# Patient Record
Sex: FEMALE | Race: OTHER | Employment: STUDENT | URBAN - METROPOLITAN AREA
[De-identification: names, ages, dates, MRNs, and addresses within clinical notes are randomized per-mention and may not be internally consistent; named-entity substitution may affect disease eponyms.]

---
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
## 2020-02-10 ENCOUNTER — OFFICE VISIT (OUTPATIENT)
Dept: URGENT CARE | Age: 20
End: 2020-02-10

## 2020-02-10 VITALS
DIASTOLIC BLOOD PRESSURE: 75 MMHG | HEART RATE: 78 BPM | RESPIRATION RATE: 18 BRPM | SYSTOLIC BLOOD PRESSURE: 103 MMHG | TEMPERATURE: 98.1 F | OXYGEN SATURATION: 97 %

## 2020-02-10 DIAGNOSIS — R05.9 COUGH: ICD-10-CM

## 2020-02-10 DIAGNOSIS — R52 BODY ACHES: ICD-10-CM

## 2020-02-10 DIAGNOSIS — J06.9 ACUTE UPPER RESPIRATORY INFECTION, UNSPECIFIED: Primary | ICD-10-CM

## 2020-02-10 LAB
FLUAV AG UPPER RESP QL IA.RAPID: NEGATIVE
FLUBV AG UPPER RESP QL IA.RAPID: NEGATIVE
INTERNAL PROCEDURAL CONTROLS ACCEPTABLE: YES
S PYO AG THROAT QL IA.RAPID: POSITIVE

## 2020-02-10 PROCEDURE — 87804 INFLUENZA ASSAY W/OPTIC: CPT | Performed by: PHYSICIAN ASSISTANT

## 2020-02-10 PROCEDURE — 87880 STREP A ASSAY W/OPTIC: CPT | Performed by: PHYSICIAN ASSISTANT

## 2020-02-10 PROCEDURE — 99214 OFFICE O/P EST MOD 30 MIN: CPT | Performed by: PHYSICIAN ASSISTANT

## 2020-02-10 RX ORDER — AMOXICILLIN AND CLAVULANATE POTASSIUM 875; 125 MG/1; MG/1
1 TABLET, FILM COATED ORAL 2 TIMES DAILY
Qty: 14 TABLET | Refills: 0 | Status: SHIPPED | OUTPATIENT
Start: 2020-02-10 | End: 2020-02-17

## 2020-02-20 ENCOUNTER — OFFICE VISIT (OUTPATIENT)
Dept: URGENT CARE | Age: 20
End: 2020-02-20

## 2020-02-20 VITALS
SYSTOLIC BLOOD PRESSURE: 97 MMHG | HEART RATE: 65 BPM | OXYGEN SATURATION: 96 % | TEMPERATURE: 97.9 F | BODY MASS INDEX: 23.05 KG/M2 | DIASTOLIC BLOOD PRESSURE: 64 MMHG | WEIGHT: 135 LBS | RESPIRATION RATE: 16 BRPM | HEIGHT: 64 IN

## 2020-02-20 DIAGNOSIS — R05.9 COUGH: Primary | ICD-10-CM

## 2020-02-20 PROCEDURE — 99214 OFFICE O/P EST MOD 30 MIN: CPT | Performed by: PHYSICIAN ASSISTANT

## 2020-02-20 RX ORDER — METHYLPREDNISOLONE 4 MG/1
4 TABLET ORAL SEE ADMIN INSTRUCTIONS
Qty: 21 TABLET | Refills: 0 | Status: SHIPPED | OUTPATIENT
Start: 2020-02-20

## 2020-02-20 ASSESSMENT — ENCOUNTER SYMPTOMS
SPUTUM PRODUCTION: 0
COUGH: 1
CONSTITUTIONAL NEGATIVE: 1
WHEEZING: 0

## 2020-03-09 ENCOUNTER — TELEPHONE (OUTPATIENT)
Dept: FAMILY MEDICINE CLINIC | Facility: CLINIC | Age: 20
End: 2020-03-09

## 2020-03-09 NOTE — TELEPHONE ENCOUNTER
Attempted to reach pt.   Both numbers listed on chart - no answer/busy signal.  Please try again later

## 2020-03-12 ENCOUNTER — OFFICE VISIT (OUTPATIENT)
Dept: FAMILY MEDICINE CLINIC | Facility: CLINIC | Age: 20
End: 2020-03-12
Payer: COMMERCIAL

## 2020-03-12 VITALS
BODY MASS INDEX: 21.97 KG/M2 | HEART RATE: 69 BPM | DIASTOLIC BLOOD PRESSURE: 66 MMHG | WEIGHT: 124 LBS | HEIGHT: 63 IN | SYSTOLIC BLOOD PRESSURE: 100 MMHG | TEMPERATURE: 98 F

## 2020-03-12 DIAGNOSIS — R05.9 COUGH: Primary | ICD-10-CM

## 2020-03-12 DIAGNOSIS — R09.82 POST-NASAL DRIP: ICD-10-CM

## 2020-03-12 PROCEDURE — 99202 OFFICE O/P NEW SF 15 MIN: CPT | Performed by: FAMILY MEDICINE

## 2020-03-12 PROCEDURE — 99212 OFFICE O/P EST SF 10 MIN: CPT | Performed by: FAMILY MEDICINE

## 2020-03-12 RX ORDER — ALBUTEROL SULFATE 90 UG/1
2 AEROSOL, METERED RESPIRATORY (INHALATION) EVERY 6 HOURS PRN
Qty: 1 INHALER | Refills: 0 | Status: SHIPPED | OUTPATIENT
Start: 2020-03-12 | End: 2020-11-25

## 2020-03-12 RX ORDER — FLUTICASONE PROPIONATE 50 MCG
2 SPRAY, SUSPENSION (ML) NASAL NIGHTLY
Qty: 1 BOTTLE | Refills: 0 | Status: SHIPPED | OUTPATIENT
Start: 2020-03-12 | End: 2020-04-11

## 2020-03-12 RX ORDER — LORATADINE 10 MG/1
10 TABLET ORAL DAILY
Qty: 30 TABLET | Refills: 0 | Status: SHIPPED | OUTPATIENT
Start: 2020-03-12 | End: 2020-05-14

## 2020-03-12 NOTE — PROGRESS NOTES
HPI:    Patient ID: Parvin Christy is a 23year old female.     HPI  Patient presents with:  Cough: X 1 month pt states she went to immediate care in her school before but cough doesn't go away, pt also states she had strep about a month ago     Goes to [facility] every 6 (six) hours as needed for Wheezing or Shortness of Breath. Dispense: 1 Inhaler; Refill: 0    2. Post-nasal drip    - Fluticasone Propionate 50 MCG/ACT Nasal Suspension; 2 sprays by Each Nare route nightly. Dispense: 1 Bottle;  Refill: 0  - l

## 2020-05-14 ENCOUNTER — NURSE TRIAGE (OUTPATIENT)
Dept: FAMILY MEDICINE CLINIC | Facility: CLINIC | Age: 20
End: 2020-05-14

## 2020-05-14 ENCOUNTER — VIRTUAL PHONE E/M (OUTPATIENT)
Dept: FAMILY MEDICINE CLINIC | Facility: CLINIC | Age: 20
End: 2020-05-14
Payer: COMMERCIAL

## 2020-05-14 DIAGNOSIS — R05.9 COUGH: Primary | ICD-10-CM

## 2020-05-14 PROCEDURE — 99214 OFFICE O/P EST MOD 30 MIN: CPT | Performed by: FAMILY MEDICINE

## 2020-05-14 RX ORDER — LEVOCETIRIZINE DIHYDROCHLORIDE 5 MG/1
5 TABLET, FILM COATED ORAL EVERY EVENING
Qty: 30 TABLET | Refills: 1 | Status: SHIPPED | OUTPATIENT
Start: 2020-05-14 | End: 2020-11-25

## 2020-05-14 NOTE — PROGRESS NOTES
Due to the COVID-19 emergency implementation plan, this patient's visit was converted to a telephone visit as agreed upon with the patient.     Please note that the following visit was completed using two-way, real-time interactive audio and/or video commun negatives noted in the the HPI. PATIENTS DENIES ANY KNOWN EXPOSURE TO ANYONE CONFIRMED FOR COVID 19.         Past Medical History:   Diagnosis Date   • Dehydration 2 yr old         MEDICATION LIST    Current Outpatient Medications:   •  Levocetirizine Signed Prescriptions Disp Refills   • Levocetirizine Dihydrochloride 5 MG Oral Tab 30 tablet 1     Sig: Take 1 tablet (5 mg total) by mouth every evening.        Imaging & Referrals:  XR CHEST PA + LAT CHEST (CPT=71046)      Patient reminded to practice

## 2020-05-14 NOTE — TELEPHONE ENCOUNTER
Action Requested: Summary for Provider     []  Critical Lab, Recommendations Needed  [] Need Additional Advice  []   FYI    []   Need Orders  [] Need Medications Sent to Pharmacy  []  Other     SUMMARY: Patient reports cough with phlegm for 2 weeks, also w

## 2020-11-02 ENCOUNTER — HOSPITAL ENCOUNTER (OUTPATIENT)
Age: 20
Discharge: HOME | End: 2020-11-02
Attending: EMERGENCY MEDICINE
Payer: COMMERCIAL

## 2020-11-02 VITALS
OXYGEN SATURATION: 100 % | SYSTOLIC BLOOD PRESSURE: 116 MMHG | DIASTOLIC BLOOD PRESSURE: 68 MMHG | RESPIRATION RATE: 16 BRPM | HEART RATE: 112 BPM | TEMPERATURE: 98 F

## 2020-11-02 DIAGNOSIS — Z20.822 CLOSE EXPOSURE TO COVID-19 VIRUS: Primary | ICD-10-CM

## 2020-11-02 PROCEDURE — 99213 OFFICE O/P EST LOW 20 MIN: CPT

## 2020-11-05 NOTE — ED PROVIDER NOTES
Patient Seen in: [facility]      History   Patient presents with:  Testing    Stated Complaint: covid test    HPI    20 yo female with COVID exposure. She is asymptomatic.      Past Medical History:   Diagnosis Date   • Dehydration 2 yr old No sensory deficit.    Psychiatric:         Mood and Affect: Mood normal.         Behavior: Behavior normal.              ED Course     Labs Reviewed   2019 NOVEL CORONAVIRUS SARS-COV-2 BY PCR([facility])                  MDM                         Disposition a

## 2020-11-12 ENCOUNTER — OFFICE VISIT (OUTPATIENT)
Dept: FAMILY MEDICINE CLINIC | Facility: CLINIC | Age: 20
End: 2020-11-12
Payer: COMMERCIAL

## 2020-11-12 VITALS
BODY MASS INDEX: 20.2 KG/M2 | OXYGEN SATURATION: 98 % | TEMPERATURE: 99 F | WEIGHT: 114 LBS | RESPIRATION RATE: 12 BRPM | DIASTOLIC BLOOD PRESSURE: 76 MMHG | SYSTOLIC BLOOD PRESSURE: 107 MMHG | HEIGHT: 63 IN | HEART RATE: 66 BPM

## 2020-11-12 DIAGNOSIS — Z20.822 EXPOSURE TO COVID-19 VIRUS: Primary | ICD-10-CM

## 2020-11-12 PROCEDURE — 3074F SYST BP LT 130 MM HG: CPT | Performed by: NURSE PRACTITIONER

## 2020-11-12 PROCEDURE — 99202 OFFICE O/P NEW SF 15 MIN: CPT | Performed by: NURSE PRACTITIONER

## 2020-11-12 PROCEDURE — 3078F DIAST BP <80 MM HG: CPT | Performed by: NURSE PRACTITIONER

## 2020-11-12 PROCEDURE — 3008F BODY MASS INDEX DOCD: CPT | Performed by: NURSE PRACTITIONER

## 2020-11-12 NOTE — PROGRESS NOTES
CHIEF COMPLAINT:   Patient presents with:  Covid: Desires COVID testing. Exposure to Father who is COVID positve over the past couple weeks. Asymptomatic. HPI:   Alejandro Oliveruphin is a 21year old female who presents for Covid 19 exposure.   Father teste LUNGS: clear to auscultation bilaterally; good air movement. Breathing is non labored. Cough- absent.   CARDIO: RRR without murmur      ASSESSMENT AND PLAN:   Basim García is a 21year old female who presents with     ASSESSMENT: Exposure to covid-19 vi · Don’t share food or personal items with people in your household. This includes items like eating and drinking utensils, towels, and bedding. · Wear a cloth face mask around other people.  During a public health emergency, medical face masks may be reser · Wear a face mask. This is to protect other people from your germs. If you are not able to wear a mask, your caregivers should. During a public health emergency, medical face masks may be reserved for healthcare workers.  You may need to make a cloth face If you've been in the hospital for suspected or confirmed COVID-19 and now are home, follow all of your healthcare team's instructions. This will include when it's OK to stop self-isolation.  You may also get instructions on position changes to help your br Your limits are different if you've had COVID-19 in the last 3 months but are fully recovered without symptoms and you have been exposed to someone with COVID-19. If you are symptom-free, you don't need to stay home away from others or be retested.  The CDC When you return to public settings  When you are well enough to go outside your home, consider the CDC's guidance on cloth face masks:     · The CDC advises all people over age 2 to wear cloth face masks in public settings when around people outside of the © 2292-4332 [address] [facility] [place]. All rights reserved. This information is not intended as a substitute for professional medical care. Always follow your healthcare professional's instructions.

## 2020-11-25 PROBLEM — Z86.19 HISTORY OF CHLAMYDIA INFECTION: Status: ACTIVE | Noted: 2020-11-25

## 2021-07-08 ENCOUNTER — HOSPITAL ENCOUNTER (OUTPATIENT)
Age: 21
Discharge: HOME | End: 2021-07-08
Attending: PHYSICIAN ASSISTANT
Payer: COMMERCIAL

## 2021-07-08 VITALS
SYSTOLIC BLOOD PRESSURE: 114 MMHG | BODY MASS INDEX: 24.84 KG/M2 | HEIGHT: 62 IN | TEMPERATURE: 98 F | DIASTOLIC BLOOD PRESSURE: 74 MMHG | HEART RATE: 79 BPM | WEIGHT: 135 LBS | OXYGEN SATURATION: 100 % | RESPIRATION RATE: 16 BRPM

## 2021-07-08 DIAGNOSIS — B07.9 VIRAL WART ON FINGER: ICD-10-CM

## 2021-07-08 DIAGNOSIS — L03.011 PARONYCHIA OF FINGER OF RIGHT HAND: Primary | ICD-10-CM

## 2021-07-08 PROCEDURE — 99213 OFFICE O/P EST LOW 20 MIN: CPT | Performed by: PHYSICIAN ASSISTANT

## 2021-07-08 PROCEDURE — 10060 I&D ABSCESS SIMPLE/SINGLE: CPT | Performed by: PHYSICIAN ASSISTANT

## 2021-07-08 RX ORDER — IBUPROFEN 600 MG/1
TABLET ORAL
Qty: 20 TABLET | Refills: 0 | Status: SHIPPED | OUTPATIENT
Start: 2021-07-08

## 2021-07-08 RX ORDER — CEPHALEXIN 500 MG/1
500 CAPSULE ORAL 3 TIMES DAILY
Qty: 21 CAPSULE | Refills: 0 | Status: SHIPPED | OUTPATIENT
Start: 2021-07-08 | End: 2021-07-15

## 2021-07-08 NOTE — ED PROVIDER NOTES
Patient Seen in: [facility]    History   Patient presents with:  Derm Problem    Stated Complaint: swollen finger and pain x 1 week    HPI    26-year-old female presents with chief complaint of redness and swelling of right second digit.   Ons systems are as noted in HPI. Constitutional and vital signs reviewed. All other systems reviewed and negative except as noted above. PSFH elements reviewed from today and agreed except as otherwise stated in HPI.     Physical Exam     ED Triage Vit refill normal.  Motor intact distally. Sensory intact distally. No ecchymosis. No open wounds. No compartment syndrome. No edema. Left upper extremity-Left upper extremity without acute bony deformity.   A 4 mm x 4 mm circular lesion of hyperkeratot understanding of the discharge instructions and plan.       Disposition and Plan     Clinical Impression:  Paronychia of finger of right hand  (primary encounter diagnosis)  Viral wart on finger    Disposition:  Discharge    Follow-up:  Roge Braxton

## (undated) NOTE — LETTER
[address]  [address] [phone]     Patient: Rachell Nanceist   YOB: 2000   Date of Visit: 11/12/2020     Dear Employer,        November 12, 2020    At [facility], we are taking sp Persons infected with SARS-CoV-2 who never develop COVID-19 symptoms may discontinue isolation and other precautions 10 days after the date of their first positive RT-PCR test for SARS-CoV-2 RNA.     Persons who are asymptomatic but have been exposed, CDC r

## (undated) NOTE — ED AVS SNAPSHOT
Christopher Simpson   MRN: [id number]    Department:  [facility] Emergency Department   Date of Visit:  7/18/2019           Disclosure     Insurance plans vary and the physician(s) referred by the ER may not be covered by your plan.  Please contact y CARE PHYSICIAN AT ONCE OR RETURN IMMEDIATELY TO THE EMERGENCY DEPARTMENT. If you have been prescribed any medication(s), please fill your prescription right away and begin taking the medication(s) as directed.   If you believe that any of the medications